# Patient Record
Sex: MALE | NOT HISPANIC OR LATINO | Employment: PART TIME | URBAN - NONMETROPOLITAN AREA
[De-identification: names, ages, dates, MRNs, and addresses within clinical notes are randomized per-mention and may not be internally consistent; named-entity substitution may affect disease eponyms.]

---

## 2018-07-26 ENCOUNTER — HOSPITAL ENCOUNTER (OUTPATIENT)
Dept: PHYSICAL THERAPY | Facility: HOSPITAL | Age: 23
Discharge: HOME | End: 2018-07-26

## 2018-07-26 PROCEDURE — 97799 UNLISTED PHYSCL MED/REHAB PX: CPT

## 2018-08-21 ENCOUNTER — APPOINTMENT (OUTPATIENT)
Dept: GENERAL RADIOLOGY | Facility: HOSPITAL | Age: 23
End: 2018-08-21

## 2018-08-21 ENCOUNTER — HOSPITAL ENCOUNTER (EMERGENCY)
Facility: HOSPITAL | Age: 23
Discharge: HOME | End: 2018-08-21
Admitting: NURSE PRACTITIONER

## 2018-08-21 VITALS
SYSTOLIC BLOOD PRESSURE: 122 MMHG | RESPIRATION RATE: 16 BRPM | DIASTOLIC BLOOD PRESSURE: 77 MMHG | HEART RATE: 87 BPM | OXYGEN SATURATION: 99 % | WEIGHT: 270 LBS | HEIGHT: 72 IN | TEMPERATURE: 98 F | BODY MASS INDEX: 36.57 KG/M2

## 2018-08-21 DIAGNOSIS — S92.902A CLOSED FRACTURE OF LEFT FOOT, INITIAL ENCOUNTER: Primary | ICD-10-CM

## 2018-08-21 PROCEDURE — 73630 X-RAY EXAM OF FOOT: CPT

## 2018-08-21 PROCEDURE — 90471 IMMUNIZATION ADMIN: CPT | Performed by: NURSE PRACTITIONER

## 2018-08-21 PROCEDURE — 99283 EMERGENCY DEPT VISIT LOW MDM: CPT

## 2018-08-21 PROCEDURE — 90715 TDAP VACCINE 7 YRS/> IM: CPT | Performed by: NURSE PRACTITIONER

## 2018-08-21 PROCEDURE — 25010000002 KETOROLAC TROMETHAMINE PER 15 MG: Performed by: NURSE PRACTITIONER

## 2018-08-21 PROCEDURE — 25010000002 TDAP 5-2.5-18.5 LF-MCG/0.5 SUSPENSION: Performed by: NURSE PRACTITIONER

## 2018-08-21 PROCEDURE — 96372 THER/PROPH/DIAG INJ SC/IM: CPT

## 2018-08-21 RX ORDER — OXYCODONE HYDROCHLORIDE AND ACETAMINOPHEN 5; 325 MG/1; MG/1
1 TABLET ORAL EVERY 4 HOURS PRN
Qty: 12 TABLET | Refills: 0 | Status: SHIPPED | OUTPATIENT
Start: 2018-08-21

## 2018-08-21 RX ORDER — KETOROLAC TROMETHAMINE 30 MG/ML
30 INJECTION, SOLUTION INTRAMUSCULAR; INTRAVENOUS EVERY 6 HOURS PRN
Status: DISCONTINUED | OUTPATIENT
Start: 2018-08-21 | End: 2018-08-21

## 2018-08-21 RX ORDER — OXYCODONE AND ACETAMINOPHEN 7.5; 325 MG/1; MG/1
1 TABLET ORAL ONCE
Status: COMPLETED | OUTPATIENT
Start: 2018-08-21 | End: 2018-08-21

## 2018-08-21 RX ORDER — KETOROLAC TROMETHAMINE 15 MG/ML
60 INJECTION, SOLUTION INTRAMUSCULAR; INTRAVENOUS EVERY 6 HOURS PRN
Status: DISCONTINUED | OUTPATIENT
Start: 2018-08-21 | End: 2018-08-21 | Stop reason: HOSPADM

## 2018-08-21 RX ADMIN — OXYCODONE HYDROCHLORIDE AND ACETAMINOPHEN 1 TABLET: 7.5; 325 TABLET ORAL at 14:22

## 2018-08-21 RX ADMIN — KETOROLAC TROMETHAMINE 60 MG: 15 INJECTION, SOLUTION INTRAMUSCULAR; INTRAVENOUS at 13:52

## 2018-08-21 RX ADMIN — TETANUS TOXOID, REDUCED DIPHTHERIA TOXOID AND ACELLULAR PERTUSSIS VACCINE, ADSORBED 0.5 ML: 5; 2.5; 8; 8; 2.5 SUSPENSION INTRAMUSCULAR at 14:23

## 2018-08-21 NOTE — DISCHARGE INSTRUCTIONS
Return to ER if symptoms worsen   Elevate/ ice  Keep splint clean and dry  No weight bearing     Cast or Splint Care, Adult  Casts and splints are supports that are worn to protect broken bones and other injuries. A cast or splint may hold a bone still and in the correct position while it heals. Casts and splints may also help ease pain, swelling, and muscle spasms.  A cast is a hardened support that is usually made of fiberglass or plaster. It is custom-fit to the body and it offers more protection than a splint. It cannot be taken off and put back on. A splint is a type of soft support that is usually made from cloth and elastic. It can be adjusted or taken off as needed.  You may need a cast or a splint if you:  · Have a broken bone.  · Have a soft-tissue injury.  · Need to keep an injured body part from moving (keep it immobile) after surgery.    How is this treated?  If you have a cast:  · Do not stick anything inside the cast to scratch your skin. Sticking something in the cast increases your risk of infection.  · Check the skin around the cast every day. Tell your health care provider about any concerns.  · You may put lotion on dry skin around the edges of the cast. Do not put lotion on the skin underneath the cast.  · Keep the cast clean.  · If the cast is not waterproof:  ? Do not let it get wet.  ? Cover it with a watertight covering when you take a bath or a shower.  If you have a splint:  · Wear it as told by your health care provider. Remove it only as told by your health care provider.  · Loosen the splint if your fingers or toes tingle, become numb, or turn cold and blue.  · Keep the splint clean.  · If the splint is not waterproof:  ? Do not let it get wet.  ? Cover it with a watertight covering when you take a bath or a shower.  Bathing  · Do not take baths or swim until your health care provider approves. Ask your health care provider if you can take showers. You may only be allowed to take sponge  baths for bathing.  · If your cast or splint is not waterproof, cover it with a watertight covering when you take a bath or shower.  Managing pain, stiffness, and swelling  · Move your fingers or toes often to avoid stiffness and to lessen swelling.  · Raise (elevate) the injured area above the level of your heart while sitting or lying down.  Safety  · Do not use the injured limb to support your body weight until your health care provider says that it is okay.  · Use crutches or other assistive devices as told by your health care provider.  General instructions  · Do not put pressure on any part of the cast or splint until it is fully hardened. This may take several hours.  · Return to your normal activities as told by your health care provider. Ask your health care provider what activities are safe for you.  · Take over-the-counter and prescription medicines only as told by your health care provider.  · Keep all follow-up visits as told by your health care provider. This is important.  Contact a health care provider if:  · Your cast or splint gets damaged.  · The skin around the cast gets red or raw.  · The skin under the cast is extremely itchy or painful.  · Your cast or splint feels very uncomfortable.  · Your cast or splint is too tight or too loose.  · Your cast becomes wet or it develops a soft spot or area.  · You get an object stuck under your cast.  Get help right away if:  · Your pain is getting worse.  · The injured area tingles, becomes numb, or turns cold and blue.  · The part of your body above or below the cast is swollen and discolored.  · You cannot feel or move your fingers or toes.  · There is fluid leaking through the cast.  · You have severe pain or pressure under the cast.  · You have trouble breathing.  · You have shortness of breath.  · You have chest pain.  This information is not intended to replace advice given to you by your health care provider. Make sure you discuss any questions you  have with your health care provider.  Document Released: 12/15/2001 Document Revised: 07/08/2017 Document Reviewed: 06/10/2017  [employer] Interactive Patient Education © 2018 [employer]

## 2018-08-21 NOTE — ED PROVIDER NOTES
"Subjective   Patient is a 23-year-old white male presents with left foot injury.  He states that he was at work and still became across his left foot.  He states this occurred about an hour prior to arrival.  He denies any other injury.        History provided by:  Patient   used: No        Review of Systems   Constitutional: Negative.    HENT: Negative.    Eyes: Negative.    Respiratory: Negative.    Cardiovascular: Negative.    Gastrointestinal: Negative.    Endocrine: Negative.    Genitourinary: Negative.    Musculoskeletal:        Patient is a 23-year-old white male presents with left foot injury.  He states that he was at work and still became across his left foot.  He states this occurred about an hour prior to arrival.  He denies any other injury.     Skin: Negative.    Allergic/Immunologic: Negative.    Neurological: Negative.    Hematological: Negative.    Psychiatric/Behavioral: Negative.    All other systems reviewed and are negative.      History reviewed. No pertinent past medical history.    No Known Allergies    History reviewed. No pertinent surgical history.    History reviewed. No pertinent family history.    Social History     Social History   • Marital status: Unknown     Social History Main Topics   • Smoking status: Never Smoker   • Alcohol use Yes      Comment: weekends   • Drug use: No     Other Topics Concern   • Not on file       Prior to Admission medications    Not on File       /77   Pulse 87   Temp 98 °F (36.7 °C)   Resp 16   Ht 182.9 cm (72\")   Wt 122 kg (270 lb)   SpO2 99%   BMI 36.62 kg/m²     Objective   Physical Exam   Constitutional: He is oriented to person, place, and time. He appears well-developed and well-nourished.   HENT:   Head: Normocephalic and atraumatic.   Eyes: Pupils are equal, round, and reactive to light. Conjunctivae and EOM are normal.   Neck: Normal range of motion. Neck supple.   Cardiovascular: Normal rate, regular rhythm, " normal heart sounds and intact distal pulses.    Pulmonary/Chest: Effort normal and breath sounds normal.   Abdominal: Soft. Bowel sounds are normal.   Musculoskeletal:   Left foot: moderate amt of soft tissue swelling noted to dorsal aspect of left foot. Abrasion noted. Pedal pulses palp.    Neurological: He is alert and oriented to person, place, and time. He has normal reflexes.   Skin: Skin is warm and dry.   Psychiatric: He has a normal mood and affect. His behavior is normal. Judgment and thought content normal.   Nursing note and vitals reviewed.      Procedures         Lab Results (last 24 hours)     ** No results found for the last 24 hours. **          XR Foot 3+ View Left   ED Interpretation   Minimally displaced fractures of the first, second and third   metatarsals in the mid shaft regions.       This report was finalized on 08/21/2018 14:07 by Dr. Rachid Reina MD.      Final Result   Minimally displaced fractures of the first, second and third   metatarsals in the mid shaft regions.       This report was finalized on 08/21/2018 14:07 by Dr. Rachid Reina MD.          ED Course  ED Course as of Aug 21 1834   Tue Aug 21, 2018   1412 Call placed to ortho for further.   [CW]   1451 Spoke with dr mayorga- will see pt in the clinic next week on Monday. [facility] report completed #[id number]. No signs of suspicious activity noted. Will write for small amt of pain medication for acute pain. Reviewed side effects and potential for abuse   [CW]      ED Course User Index  [CW] Clarisa Guallpa, AMY          MDM  Number of Diagnoses or Management Options  Closed fracture of left foot, initial encounter: new and requires workup     Amount and/or Complexity of Data Reviewed  Tests in the radiology section of CPT®: ordered and reviewed  Independent visualization of images, tracings, or specimens: yes    Patient Progress  Patient progress: stable      Final diagnoses:   Closed fracture of left foot, initial  encounter          Clarisa Guallpa, APRN  08/21/18 2686

## 2018-10-01 ENCOUNTER — TRANSCRIBE ORDERS (OUTPATIENT)
Dept: ADMINISTRATIVE | Facility: HOSPITAL | Age: 23
End: 2018-10-01

## 2018-10-01 ENCOUNTER — HOSPITAL ENCOUNTER (OUTPATIENT)
Dept: ULTRASOUND IMAGING | Facility: HOSPITAL | Age: 23
Discharge: HOME | End: 2018-10-01
Admitting: NURSE PRACTITIONER

## 2018-10-01 DIAGNOSIS — I82.402 ACUTE EMBOLISM AND THROMBOSIS OF DEEP VEIN OF LEFT LOWER EXTREMITY (HCC): Primary | ICD-10-CM

## 2018-10-01 DIAGNOSIS — I82.402 ACUTE EMBOLISM AND THROMBOSIS OF DEEP VEIN OF LEFT LOWER EXTREMITY (HCC): ICD-10-CM

## 2018-10-01 DIAGNOSIS — Z47.89 ORTHOPEDIC AFTERCARE: ICD-10-CM

## 2018-10-01 PROCEDURE — 93971 EXTREMITY STUDY: CPT
